# Patient Record
Sex: FEMALE | Race: WHITE | Employment: FULL TIME | URBAN - NONMETROPOLITAN AREA
[De-identification: names, ages, dates, MRNs, and addresses within clinical notes are randomized per-mention and may not be internally consistent; named-entity substitution may affect disease eponyms.]

---

## 2017-10-04 ENCOUNTER — HOSPITAL ENCOUNTER (EMERGENCY)
Age: 27
Discharge: HOME | End: 2017-10-04

## 2017-10-04 ENCOUNTER — APPOINTMENT (OUTPATIENT)
Dept: GENERAL RADIOLOGY | Age: 27
End: 2017-10-04

## 2017-10-04 VITALS
DIASTOLIC BLOOD PRESSURE: 65 MMHG | RESPIRATION RATE: 16 BRPM | BODY MASS INDEX: 27.09 KG/M2 | TEMPERATURE: 98.9 F | HEIGHT: 72 IN | OXYGEN SATURATION: 100 % | SYSTOLIC BLOOD PRESSURE: 131 MMHG | HEART RATE: 77 BPM | WEIGHT: 200 LBS

## 2017-10-04 LAB
GROUP A STREP CULTURE, REFLEX: NEGATIVE
REFLEX THROAT C + S: NORMAL

## 2017-10-04 PROCEDURE — 71020 XR CHEST STANDARD TWO VW: CPT

## 2017-10-04 PROCEDURE — 4500000002 HC ER NO CHARGE

## 2017-10-04 PROCEDURE — 87070 CULTURE OTHR SPECIMN AEROBIC: CPT

## 2017-10-04 PROCEDURE — 87880 STREP A ASSAY W/OPTIC: CPT

## 2017-10-04 ASSESSMENT — PAIN SCALES - GENERAL: PAINLEVEL_OUTOF10: 8

## 2017-10-04 NOTE — ED TRIAGE NOTES
Pt to er. Pt c/o sough and sore throat worse with coughing. States coughing up thick yellow stuff. No nfevers.

## 2017-10-06 LAB — THROAT/NOSE CULTURE: NORMAL

## 2019-01-10 ENCOUNTER — HOSPITAL ENCOUNTER (EMERGENCY)
Age: 29
Discharge: HOME | End: 2019-01-10
Attending: EMERGENCY MEDICINE

## 2019-01-10 VITALS
WEIGHT: 200 LBS | TEMPERATURE: 98.8 F | SYSTOLIC BLOOD PRESSURE: 105 MMHG | HEART RATE: 80 BPM | RESPIRATION RATE: 20 BRPM | OXYGEN SATURATION: 98 % | HEIGHT: 72 IN | BODY MASS INDEX: 27.09 KG/M2 | DIASTOLIC BLOOD PRESSURE: 68 MMHG

## 2019-01-10 DIAGNOSIS — Z33.2: Primary | ICD-10-CM

## 2019-01-10 PROCEDURE — 99282 EMERGENCY DEPT VISIT SF MDM: CPT

## 2019-01-10 ASSESSMENT — ENCOUNTER SYMPTOMS
RHINORRHEA: 0
DIARRHEA: 0
NAUSEA: 0
VOMITING: 0
ABDOMINAL PAIN: 0
BACK PAIN: 0
SHORTNESS OF BREATH: 0
SORE THROAT: 0

## 2020-09-11 ENCOUNTER — HOSPITAL ENCOUNTER (OUTPATIENT)
Age: 30
Setting detail: SPECIMEN
Discharge: HOME | End: 2020-09-11

## 2020-09-17 ENCOUNTER — HOSPITAL ENCOUNTER (EMERGENCY)
Age: 30
Discharge: HOME | End: 2020-09-17

## 2020-09-17 ENCOUNTER — APPOINTMENT (OUTPATIENT)
Dept: GENERAL RADIOLOGY | Age: 30
End: 2020-09-17

## 2020-09-17 VITALS
TEMPERATURE: 97.9 F | RESPIRATION RATE: 18 BRPM | SYSTOLIC BLOOD PRESSURE: 113 MMHG | DIASTOLIC BLOOD PRESSURE: 65 MMHG | OXYGEN SATURATION: 99 % | HEART RATE: 98 BPM

## 2020-09-17 PROCEDURE — 72100 X-RAY EXAM L-S SPINE 2/3 VWS: CPT

## 2020-09-17 PROCEDURE — 99281 EMR DPT VST MAYX REQ PHY/QHP: CPT

## 2020-09-17 PROCEDURE — 96372 THER/PROPH/DIAG INJ SC/IM: CPT

## 2020-09-17 PROCEDURE — 6360000002 HC RX W HCPCS: Performed by: PHYSICIAN ASSISTANT

## 2020-09-17 RX ORDER — KETOROLAC TROMETHAMINE 30 MG/ML
30 INJECTION, SOLUTION INTRAMUSCULAR; INTRAVENOUS ONCE
Status: COMPLETED | OUTPATIENT
Start: 2020-09-17 | End: 2020-09-17

## 2020-09-17 RX ORDER — CYCLOBENZAPRINE HCL 10 MG
10 TABLET ORAL 3 TIMES DAILY PRN
Qty: 21 TABLET | Refills: 0 | Status: SHIPPED | OUTPATIENT
Start: 2020-09-17 | End: 2020-09-27

## 2020-09-17 RX ORDER — NAPROXEN 500 MG/1
500 TABLET ORAL 2 TIMES DAILY
Qty: 60 TABLET | Refills: 0 | Status: SHIPPED | OUTPATIENT
Start: 2020-09-17

## 2020-09-17 RX ADMIN — KETOROLAC TROMETHAMINE 30 MG: 30 INJECTION, SOLUTION INTRAMUSCULAR at 18:31

## 2020-09-17 ASSESSMENT — PAIN SCALES - GENERAL: PAINLEVEL_OUTOF10: 10

## 2020-09-17 NOTE — ED PROVIDER NOTES
Reason for Visit: Back Pain      HISTORY OF PRESENT ILLNESS       HPI: This 26 y.o. femalepresents to the emergency department for low back pain ongoing for several hours. Patient states that she stood up and heard a pop in her low back. Aching in nature constant worse with certain motions movements touch notes makes better mild severity. No bowel or bladder dysfunction. No saddle paresthesias. Able to ambulate without difficulty. No chest pain shortness of breath fever chills nausea vomiting. Other complaints. No past medical history on file.     Past Surgical History:   Procedure Laterality Date    WISDOM TOOTH EXTRACTION         Patricia Terrazas   Social History     Socioeconomic History    Marital status: Single     Spouse name: Not on file    Number of children: Not on file    Years of education: Not on file    Highest education level: Not on file   Occupational History    Not on file   Social Needs    Financial resource strain: Not on file    Food insecurity     Worry: Not on file     Inability: Not on file    Transportation needs     Medical: Not on file     Non-medical: Not on file   Tobacco Use    Smoking status: Current Every Day Smoker     Packs/day: 0.50     Types: Cigarettes    Smokeless tobacco: Never Used   Substance and Sexual Activity    Alcohol use: No     Alcohol/week: 0.0 standard drinks    Drug use: Yes     Types: Marijuana    Sexual activity: Yes     Partners: Male   Lifestyle    Physical activity     Days per week: Not on file     Minutes per session: Not on file    Stress: Not on file   Relationships    Social connections     Talks on phone: Not on file     Gets together: Not on file     Attends [employer] service: Not on file     Active member of club or organization: Not on file     Attends meetings of clubs or organizations: Not on file     Relationship status: Not on file    Intimate partner violence     Fear of current or ex partner: Not on file     Emotionally abused: Not on file     Physically abused: Not on file     Forced sexual activity: Not on file   Other Topics Concern    Not on file   Social History Narrative    Not on file       Previous Medications    No medications on file       Allergies: Allergies as of 09/17/2020    (No Known Allergies)       Review of Systems       See HPI for further details. At least 10 systems reviewed and are otherwise negative unless noted in the history of present illness. Constitutional: Denies fever or chills   Eyes: Denies change in visual acuity   HENT: Denies nasal congestion or sore throat   Respiratory: Denies cough or shortness of breath   Cardiovascular: Denies chest pain or edema   GI: Denies abdominal pain, nausea, vomiting, bloody stools or diarrhea   : Denies dysuria   Musculoskeletal: Denies  joint pain admits back pain  Integument: Denies rash   Neurologic: Denies headache, focal weakness or sensory changes   Endocrine: Denies polyuria or polydipsia   Lymphatic: Denies swollen glands   Psychiatric: Denies depression or anxiety       Physical Exam       Vitals:    09/17/20 1749   BP: 113/65   Pulse: 98   Resp: 18   Temp: 97.9 °F (36.6 °C)   SpO2: 99%       Constitutional: No acute distress, Non-toxic appearance; well nourished    HENT: Normocephalic, Atraumatic, Bilateral external ears normal, Oropharynx moist, No oral exudates, Nose normal.    Eyes: PERRLA, EOMI, Conjunctiva normal, No discharge. Neck: Normal range of motion, No tenderness, Supple, No lymphadenopathy, No stridor. Cardiovascular: Normal heart rate, Normal rhythm, No murmurs, No rubs, No gallops. Pulmonary/Chest: Normal breath sounds, No respiratory distress, No wheezing, No  chest tenderness    Abdomen:  Bowel sounds normal, Soft, No tenderness, No masses, No pulsatile masses    Back: with paravertebral L4-L5 tenderness, No CVA tenderness    Extremities: Normal range of motion, Intact distal pulses, No edema, No tenderness    Neurologic: Alert & oriented x 3, Normal motor function, Normal sensory function, No focal defecits    Skin: Warm, Dry, No erythema, No rash    Psychiatric: Alert and oriented to person, place, and time. Patient maintains good eye contact. Mood and affect were normal. Concentration appeared normal      Diagnostic Studies       Please see electronic medical record for any tests performed in the ED. Labs:    Labs Reviewed - No data to display    Radiology:    XR LUMBAR SPINE (2-3 VIEWS)   Final Result    Mild degenerative changes of the lumbar spine. **This report has been created using voice recognition software. It may contain minor errors which are inherent in voice recognition technology. **      Final report electronically signed by Dr. Tanika Trivedi MD on 9/17/2020 6:24 PM            Emergency Department Procedures         ED Course and MDM       Vivian Carson is a 26 y.o. female who presented to the emergency department with a chief complaint of low back pain     Patient was seen, history and physical exam was performed. Patient remains stable here in the emergency department. Patient's  imaging studies and physical examination findings were reviewed and were reassuring. Labs Reviewed - No data to display  Medications   ketorolac (TORADOL) injection 30 mg (has no administration in time range)     New Prescriptions    CYCLOBENZAPRINE (FLEXERIL) 10 MG TABLET    Take 1 tablet by mouth 3 times daily as needed for Muscle spasms    NAPROXEN (NAPROSYN) 500 MG TABLET    Take 1 tablet by mouth 2 times daily         Counseling     The emergency provider has spoken with the patient and discussed today's findings, in addition to providing specific details for the plan of care. Questions are answered and there is agreement with the plan. Patient was given clear discharge and followup instructions including return to the ER immediately for worsening concerns.  Patient is advised to followup with primary care physician and/or referred physician in the next one to two days or sooner if worsening and to return to the ER immediately as above with any concerns. Usual and customary treatment and medication side effects and warning signs discussed. Patient was discharged from emergency department in good condition with all questions answered and patient has demonstrated capacity to understand these instructions and to follow up. Refer to the patient's discharge summary for more information on plan and follow-up. I have explained to the patient in appropriate terminology our work-up in the ED and their diagnosis. I have also given anticipatory guidance and expectant management of their condition as an outpatient as per my custom. They are instructed to return to the ED if their condition deteriorates in any way     This patient was seen under the direct supervision of the attending physician who was available for consultation. Differential Diagnosis    strain vs sciatica vs muscle spasm    Consults: None     DECISION to ADMIT / DISCHARGE:     6:30 PM    Clinical Impression       1.   1. Strain of lumbar region, initial encounter        Disposition       All results were shared with the patient, medical decision making was discussed and all questions were answered, and she agreed to assessment and plan. Patient was DISCHARGED from the hospital. Based on the reassuring ED workup and patient's stable vital signs, I feel the patient may be safely discharged home. At this point in time, I believe the patient has the mental capacity to make medical decisions.           Devora Forte  09/17/20 2008

## 2021-05-01 ENCOUNTER — HOSPITAL ENCOUNTER (EMERGENCY)
Age: 31
Discharge: HOME | End: 2021-05-01

## 2021-05-01 VITALS
DIASTOLIC BLOOD PRESSURE: 77 MMHG | WEIGHT: 200 LBS | OXYGEN SATURATION: 100 % | TEMPERATURE: 98.3 F | SYSTOLIC BLOOD PRESSURE: 102 MMHG | BODY MASS INDEX: 27.09 KG/M2 | HEART RATE: 102 BPM | HEIGHT: 72 IN | RESPIRATION RATE: 19 BRPM
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
## 2021-05-01 DIAGNOSIS — S09.90XA CLOSED HEAD INJURY, INITIAL ENCOUNTER: ICD-10-CM

## 2021-05-01 LAB
ANION GAP SERPL CALCULATED.3IONS-SCNC: 11 MEQ/L (ref 8–16)
BASOPHILS # BLD: 0.3 %
BASOPHILS ABSOLUTE: 0 THOU/MM3 (ref 0–0.1)
BUN BLDV-MCNC: 12 MG/DL (ref 7–22)
CALCIUM SERPL-MCNC: 9.4 MG/DL (ref 8.5–10.5)
CHLORIDE BLD-SCNC: 99 MEQ/L (ref 98–111)
CO2: 27 MEQ/L (ref 23–33)
CREAT SERPL-MCNC: 0.8 MG/DL (ref 0.4–1.2)
EKG ATRIAL RATE: 102 BPM
EKG P AXIS: 66 DEGREES
EKG P-R INTERVAL: 122 MS
EKG Q-T INTERVAL: 348 MS
EKG QRS DURATION: 80 MS
EKG QTC CALCULATION (BAZETT): 453 MS
EKG R AXIS: 66 DEGREES
EKG T AXIS: 56 DEGREES
EKG VENTRICULAR RATE: 102 BPM
EOSINOPHIL # BLD: 0.9 %
EOSINOPHILS ABSOLUTE: 0.1 THOU/MM3 (ref 0–0.4)
ERYTHROCYTE [DISTWIDTH] IN BLOOD BY AUTOMATED COUNT: 12 % (ref 11.5–14.5)
ERYTHROCYTE [DISTWIDTH] IN BLOOD BY AUTOMATED COUNT: 40.7 FL (ref 35–45)
ETHYL ALCOHOL, SERUM: < 0.01 %
GFR SERPL CREATININE-BSD FRML MDRD: 83 ML/MIN/1.73M2
GLUCOSE BLD-MCNC: 87 MG/DL (ref 70–108)
HCT VFR BLD CALC: 42 % (ref 37–47)
HEMOGLOBIN: 13.6 GM/DL (ref 12–16)
IMMATURE GRANS (ABS): 0.03 THOU/MM3 (ref 0–0.07)
IMMATURE GRANULOCYTES: 0.3 %
LYMPHOCYTES # BLD: 19.8 %
LYMPHOCYTES ABSOLUTE: 2 THOU/MM3 (ref 1–4.8)
MCH RBC QN AUTO: 29.9 PG (ref 26–33)
MCHC RBC AUTO-ENTMCNC: 32.4 GM/DL (ref 32.2–35.5)
MCV RBC AUTO: 92.3 FL (ref 81–99)
MONOCYTES # BLD: 7.2 %
MONOCYTES ABSOLUTE: 0.7 THOU/MM3 (ref 0.4–1.3)
NUCLEATED RED BLOOD CELLS: 0 /100 WBC
OSMOLALITY CALCULATION: 272.9 MOSMOL/KG (ref 275–300)
PLATELET # BLD: 382 THOU/MM3 (ref 130–400)
PMV BLD AUTO: 10.7 FL (ref 9.4–12.4)
POTASSIUM REFLEX MAGNESIUM: 3.9 MEQ/L (ref 3.5–5.2)
PREGNANCY, SERUM: NEGATIVE
RBC # BLD: 4.55 MILL/MM3 (ref 4.2–5.4)
SEG NEUTROPHILS: 71.5 %
SEGMENTED NEUTROPHILS ABSOLUTE COUNT: 7.1 THOU/MM3 (ref 1.8–7.7)
SODIUM BLD-SCNC: 137 MEQ/L (ref 135–145)
WBC # BLD: 9.9 THOU/MM3 (ref 4.8–10.8)

## 2021-05-01 PROCEDURE — 99285 EMERGENCY DEPT VISIT HI MDM: CPT

## 2021-05-01 PROCEDURE — 80048 BASIC METABOLIC PNL TOTAL CA: CPT

## 2021-05-01 PROCEDURE — 93005 ELECTROCARDIOGRAM TRACING: CPT | Performed by: NURSE PRACTITIONER

## 2021-05-01 PROCEDURE — 82077 ASSAY SPEC XCP UR&BREATH IA: CPT

## 2021-05-01 PROCEDURE — 85025 COMPLETE CBC W/AUTO DIFF WBC: CPT

## 2021-05-01 PROCEDURE — 84703 CHORIONIC GONADOTROPIN ASSAY: CPT

## 2021-05-01 PROCEDURE — 36415 COLL VENOUS BLD VENIPUNCTURE: CPT

## 2021-05-01 ASSESSMENT — ENCOUNTER SYMPTOMS
CHEST TIGHTNESS: 0
ABDOMINAL PAIN: 0
DIARRHEA: 0
SINUS PAIN: 0
PHOTOPHOBIA: 0
COUGH: 0
COLOR CHANGE: 0
WHEEZING: 0
SINUS PRESSURE: 0
BACK PAIN: 0
RHINORRHEA: 0
CONSTIPATION: 0
SORE THROAT: 0
VOMITING: 0
NAUSEA: 0
TROUBLE SWALLOWING: 0
SHORTNESS OF BREATH: 0

## 2021-05-01 ASSESSMENT — PAIN DESCRIPTION - PAIN TYPE: TYPE: ACUTE PAIN

## 2021-05-01 ASSESSMENT — PAIN SCALES - GENERAL: PAINLEVEL_OUTOF10: 10

## 2021-05-01 NOTE — ED TRIAGE NOTES
Pt comes in by EMS. She states she was walking down some stairs this morning and fell down about 10 steps. She is unsure if she lost consciousness but did hit her head. She now has a headache and left ear pain. She states her ear was hurting yesterday before she fell. She is alert and oriented in room.  GCS 15

## 2021-05-01 NOTE — ED PROVIDER NOTES
[facility] COMPLAINT       Chief Complaint   Patient presents with    Fall       Nurses Notes reviewed and I agree except as noted in the HPI. HISTORY OF PRESENT Katharine Brush is a 31 y.o. female who presents to the Emergency Department for the evaluation of fall at home. States that she missed a step going down steps and fell down approximately 10 steps, hit her head on the wall, believes she may have lost consciousness, complains of pain near her right temple. Denies gait problem, denies other injuries, denies abuse. The HPI was provided by the patient. REVIEW OF SYSTEMS     Review of Systems   Constitutional: Negative for chills, diaphoresis, fatigue and fever. HENT: Negative for congestion, ear pain, nosebleeds, rhinorrhea, sinus pressure, sinus pain, sore throat and trouble swallowing. Eyes: Negative for photophobia. Respiratory: Negative for cough, chest tightness, shortness of breath and wheezing. Cardiovascular: Negative for chest pain and palpitations. Gastrointestinal: Negative for abdominal pain, constipation, diarrhea, nausea and vomiting. Endocrine: Negative for cold intolerance and heat intolerance. Genitourinary: Negative for difficulty urinating, dysuria, flank pain, hematuria, pelvic pain, vaginal bleeding, vaginal discharge and vaginal pain. Musculoskeletal: Positive for myalgias. Negative for arthralgias, back pain, gait problem, joint swelling and neck stiffness. Skin: Negative for color change and wound. Neurological: Positive for headaches. Negative for dizziness, weakness, light-headedness and numbness. Psychiatric/Behavioral: Negative for agitation, behavioral problems, confusion, hallucinations, self-injury and suicidal ideas. The patient is not nervous/anxious. PAST MEDICAL HISTORY    has no past medical history on file.     SURGICAL HISTORY      has a past surgical history that includes [place] tooth extraction. CURRENT MEDICATIONS       Discharge Medication List as of 5/1/2021  3:08 AM      CONTINUE these medications which have NOT CHANGED    Details   naproxen (NAPROSYN) 500 MG tablet Take 1 tablet by mouth 2 times daily, Disp-60 tablet,R-0Print             ALLERGIES     has No Known Allergies. FAMILY HISTORY     She indicated that her mother is alive. She indicated that her father is alive. She indicated that her sister is alive. She indicated that her brother is alive. family history includes High Blood Pressure in her mother. SOCIAL HISTORY      reports that she has been smoking cigarettes. She has been smoking about 0.50 packs per day. She has never used smokeless tobacco. She reports previous drug use. Drug: Marijuana. She reports that she does not drink alcohol. PHYSICAL EXAM     INITIAL VITALS:  height is 6' (1.829 m) and weight is 200 lb (90.7 kg). Her oral temperature is 98.3 °F (36.8 °C). Her blood pressure is 102/77 and her pulse is 102. Her respiration is 19 and oxygen saturation is 100%. Physical Exam  Vitals signs and nursing note reviewed. Constitutional:       General: She is awake. She is not in acute distress. Appearance: Normal appearance. She is well-developed and normal weight. She is not ill-appearing, toxic-appearing or diaphoretic. HENT:      Head: Normocephalic and atraumatic. Right Ear: Tympanic membrane normal.      Left Ear: Tympanic membrane normal.      Nose: Nose normal.      Mouth/Throat:      Mouth: Mucous membranes are moist.      Pharynx: Oropharynx is clear. Eyes:      Extraocular Movements: Extraocular movements intact. Pupils: Pupils are equal, round, and reactive to light. Neck:      Musculoskeletal: Normal range of motion and neck supple. No neck rigidity or muscular tenderness. Vascular: No carotid bruit. Cardiovascular:      Rate and Rhythm: Normal rate and regular rhythm. Pulses: Normal pulses. Heart sounds: Normal heart sounds, S1 normal and S2 normal. Heart sounds not distant. No murmur. No friction rub. No gallop. Pulmonary:      Effort: Pulmonary effort is normal. No tachypnea, bradypnea, accessory muscle usage, prolonged expiration, respiratory distress or retractions. Breath sounds: Normal breath sounds. Abdominal:      General: Abdomen is flat. Bowel sounds are normal. There is no distension or abdominal bruit. There are no signs of injury. Palpations: Abdomen is soft. There is no shifting dullness, fluid wave, hepatomegaly, splenomegaly, mass or pulsatile mass. Tenderness: There is no abdominal tenderness. There is no guarding or rebound. Hernia: No hernia is present. Musculoskeletal: Normal range of motion. General: No swelling, tenderness, deformity or signs of injury. Right lower leg: No edema. Left lower leg: No edema. Lymphadenopathy:      Cervical: No cervical adenopathy. Skin:     General: Skin is warm and dry. Capillary Refill: Capillary refill takes less than 2 seconds. Neurological:      General: No focal deficit present. Mental Status: She is alert and oriented to person, place, and time. Mental status is at baseline. GCS: GCS eye subscore is 4. GCS verbal subscore is 5. GCS motor subscore is 6. Cranial Nerves: Cranial nerves are intact. Psychiatric:         Attention and Perception: Attention normal.         Mood and Affect: Mood normal.         Speech: Speech normal.         Behavior: Behavior normal. Behavior is cooperative. Thought Content:  Thought content normal.         Cognition and Memory: Cognition and memory normal.         Judgment: Judgment normal.          DIFFERENTIAL DIAGNOSIS:   Fall, closed head injury, contusion, hematoma    DIAGNOSTIC RESULTS     EKG: All EKG's are interpreted by the Emergency Department Physician who either signs or Co-signs this chart in the absence of a cardiologist.    None    RADIOLOGY: non-plainfilm images(s) such as CT, Ultrasound and MRI are read by the radiologist.    No orders to display       LABS:     Labs Reviewed   GLOMERULAR FILTRATION RATE, ESTIMATED - Abnormal; Notable for the following components:       Result Value    Est, Glom Filt Rate 83 (*)     All other components within normal limits   OSMOLALITY - Abnormal; Notable for the following components:    Osmolality Calc 272.9 (*)     All other components within normal limits   CBC WITH AUTO DIFFERENTIAL   BASIC METABOLIC PANEL W/ REFLEX TO MG FOR LOW K   HCG, SERUM, QUALITATIVE   ETHANOL   ANION GAP       EMERGENCY DEPARTMENT COURSE:   Vitals:    Vitals:    05/01/21 0153 05/01/21 0156 05/01/21 0250   BP: 123/77  102/77   Pulse: 107  102   Resp: 18  19   Temp: 98.3 °F (36.8 °C)     TempSrc: Oral     SpO2: 99%  100%   Weight:  200 lb (90.7 kg)    Height:  6' (1.829 m)        4:25 AM EDT: The patient was seen and evaluated. MDM:  Patient seen and evaluated for fall at home. States that she hit her head and believes she lost consciousness. No deformities or swelling noted on physical exam.  Basic labs were ordered, imaging was ordered, after approximately 1 hour patient states that she wishes to leave. She was ambulated with nursing staff with steady gait, believe that she is stable for discharge. Encourage patient to return if she develops more for severe symptoms. She was minimal to this plan, no other questions or concerns were voiced, patient was discharged in stable condition. CRITICAL CARE:   None    CONSULTS:  None    PROCEDURES:  None    FINAL IMPRESSION      1. Fall, initial encounter    2.  Closed head injury, initial encounter          DISPOSITION/PLAN   Discharge    PATIENT REFERRED TO:  [facility] [address] [address] EMERGENCY DEPT  [address]  Go to   If symptoms worsen      DISCHARGE MEDICATIONS:  Discharge Medication List as of 5/1/2021 3:08 AM          (Please note that portions of this note were completed with a voice recognition program.  Efforts were made to edit the dictations but occasionally words are mis-transcribed.)    The patient was given an opportunity to see the Emergency Attending. The patient voiced understanding that I was a Mid-LevelProvider and was in agreement with being seen independently by myself. Provider:  I personally performed the services described in the documentation, reviewed and edited the documentation which was dictated to the scribe in my presence, and it accurately records my words and actions.     MIKE Schulz CNP, 5/1/21, 6:22 AM       MIKE Schulz CNP  05/01/21 [address] [place] [address] APRN - CNP  05/05/21 9301

## 2021-05-01 NOTE — ED NOTES
Discharge instructions  discussed and explained with Pt. Pt. Verbalized understanding and has no further questions or needs at this time.         Jeyson Carter RN  05/01/21 0320       Jeyson Carter RN  05/01/21 0323

## 2021-05-01 NOTE — ED NOTES
Bed: 004A  Expected date:   Expected time:   Means of arrival: [facility]  Comments:     Roberto Greene RN  05/01/21 3978

## 2023-08-30 ENCOUNTER — HOSPITAL ENCOUNTER (EMERGENCY)
Age: 33
Discharge: HOME | End: 2023-08-30

## 2023-08-30 VITALS
DIASTOLIC BLOOD PRESSURE: 65 MMHG | RESPIRATION RATE: 16 BRPM | TEMPERATURE: 97.9 F | HEART RATE: 80 BPM | OXYGEN SATURATION: 98 % | SYSTOLIC BLOOD PRESSURE: 114 MMHG

## 2023-08-30 DIAGNOSIS — Z32.01 POSITIVE BLOOD PREGNANCY TEST: Primary | ICD-10-CM

## 2023-08-30 LAB — B-HCG SERPL QL: POSITIVE

## 2023-08-30 PROCEDURE — 84703 CHORIONIC GONADOTROPIN ASSAY: CPT

## 2023-08-30 PROCEDURE — 36415 COLL VENOUS BLD VENIPUNCTURE: CPT

## 2023-08-30 PROCEDURE — 99283 EMERGENCY DEPT VISIT LOW MDM: CPT

## 2023-08-30 NOTE — DISCHARGE INSTRUCTIONS
Rest, stay well-hydrated. Continue home medications as previously prescribed. Over-the-counter Tylenol as needed for fever, aches or pain. Follow-up with [facility], call for earliest available appointment. Prenatal has been sent to your preferred pharmacy. If any vaginal bleeding, abdominal pain, nausea, vomiting or any other concerns return to the ER immediately.

## 2023-08-30 NOTE — ED PROVIDER NOTES
PATIENT:  [facility]  [address] [facility] [address] [facility] [facility]  Schedule an appointment as soon as possible for a visit in 1 week  reevaluation      MIKE Andino CNP, APRN - CNP  08/30/23 2537

## 2023-08-30 NOTE — ED NOTES
Patient to ED wanting confirmation on a positive pregnancy test. LMP July 4th     Giovanna Hazel RN  08/30/23 5303

## 2023-09-13 ENCOUNTER — NURSE ONLY (OUTPATIENT)
Dept: LAB | Age: 33
End: 2023-09-13

## 2023-09-15 LAB
C. TRACHOMATIS DNA,THIN PREP: NEGATIVE
N. GONORRHOEAE DNA, THIN PREP: NEGATIVE
SOURCE: NORMAL

## 2023-09-16 LAB
SOURCE: NORMAL
TRICHOMONAS VAGINALI, MOLECULAR: NEGATIVE

## 2023-09-21 LAB — CYTOLOGY THIN PREP PAP: NORMAL

## 2024-03-12 ENCOUNTER — ANESTHESIA EVENT (OUTPATIENT)
Dept: LABOR AND DELIVERY | Age: 34
DRG: 560 | End: 2024-03-12
Payer: MEDICAID

## 2024-03-12 ENCOUNTER — HOSPITAL ENCOUNTER (INPATIENT)
Age: 34
LOS: 2 days | Discharge: HOME | DRG: 560 | End: 2024-03-14
Attending: OBSTETRICS & GYNECOLOGY | Admitting: OBSTETRICS & GYNECOLOGY
Payer: MEDICAID

## 2024-03-12 ENCOUNTER — ANESTHESIA (OUTPATIENT)
Dept: LABOR AND DELIVERY | Age: 34
DRG: 560 | End: 2024-03-12
Payer: MEDICAID

## 2024-03-12 PROBLEM — O42.90 LEAKAGE OF AMNIOTIC FLUID: Status: ACTIVE | Noted: 2024-03-12

## 2024-03-12 LAB
ABO: NORMAL
AMPHETAMINES UR QL SCN: NEGATIVE
ANTIBODY SCREEN: NORMAL
BARBITURATES UR QL SCN: NEGATIVE
BASOPHILS ABSOLUTE: 0 THOU/MM3 (ref 0–0.1)
BASOPHILS NFR BLD AUTO: 0.2 %
BENZODIAZ UR QL SCN: NEGATIVE
BZE UR QL SCN: NEGATIVE
CANNABINOIDS UR QL SCN: NEGATIVE
DEPRECATED RDW RBC AUTO: 46.5 FL (ref 35–45)
EOSINOPHIL NFR BLD AUTO: 1 %
EOSINOPHILS ABSOLUTE: 0.1 THOU/MM3 (ref 0–0.4)
ERYTHROCYTE [DISTWIDTH] IN BLOOD BY AUTOMATED COUNT: 13.4 % (ref 11.5–14.5)
FENTANYL: NEGATIVE
HCT VFR BLD AUTO: 40.9 % (ref 37–47)
HGB BLD-MCNC: 13.7 GM/DL (ref 12–16)
IMM GRANULOCYTES # BLD AUTO: 0.04 THOU/MM3 (ref 0–0.07)
IMM GRANULOCYTES NFR BLD AUTO: 0.4 %
LYMPHOCYTES ABSOLUTE: 2.2 THOU/MM3 (ref 1–4.8)
LYMPHOCYTES NFR BLD AUTO: 20.6 %
MCH RBC QN AUTO: 32 PG (ref 26–33)
MCHC RBC AUTO-ENTMCNC: 33.5 GM/DL (ref 32.2–35.5)
MCV RBC AUTO: 95.6 FL (ref 81–99)
MONOCYTES ABSOLUTE: 0.8 THOU/MM3 (ref 0.4–1.3)
MONOCYTES NFR BLD AUTO: 7.5 %
NEUTROPHILS NFR BLD AUTO: 70.3 %
NRBC BLD AUTO-RTO: 0 /100 WBC
OPIATES UR QL SCN: NEGATIVE
OXYCODONE: NEGATIVE
PCP UR QL SCN: NEGATIVE
PLATELET # BLD AUTO: 236 THOU/MM3 (ref 130–400)
PMV BLD AUTO: 12.7 FL (ref 9.4–12.4)
RBC # BLD AUTO: 4.28 MILL/MM3 (ref 4.2–5.4)
RH FACTOR: NORMAL
SEGMENTED NEUTROPHILS ABSOLUTE COUNT: 7.5 THOU/MM3 (ref 1.8–7.7)
WBC # BLD AUTO: 10.6 THOU/MM3 (ref 4.8–10.8)

## 2024-03-12 PROCEDURE — 86900 BLOOD TYPING SEROLOGIC ABO: CPT

## 2024-03-12 PROCEDURE — 6370000000 HC RX 637 (ALT 250 FOR IP): Performed by: OBSTETRICS & GYNECOLOGY

## 2024-03-12 PROCEDURE — 2500000003 HC RX 250 WO HCPCS: Performed by: ANESTHESIOLOGY

## 2024-03-12 PROCEDURE — 88307 TISSUE EXAM BY PATHOLOGIST: CPT

## 2024-03-12 PROCEDURE — 10907ZC DRAINAGE OF AMNIOTIC FLUID, THERAPEUTIC FROM PRODUCTS OF CONCEPTION, VIA NATURAL OR ARTIFICIAL OPENING: ICD-10-PCS | Performed by: OBSTETRICS & GYNECOLOGY

## 2024-03-12 PROCEDURE — 85025 COMPLETE CBC W/AUTO DIFF WBC: CPT

## 2024-03-12 PROCEDURE — 86901 BLOOD TYPING SEROLOGIC RH(D): CPT

## 2024-03-12 PROCEDURE — 3700000025 EPIDURAL BLOCK: Performed by: ANESTHESIOLOGY

## 2024-03-12 PROCEDURE — 1220000000 HC SEMI PRIVATE OB R&B

## 2024-03-12 PROCEDURE — 0HQ9XZZ REPAIR PERINEUM SKIN, EXTERNAL APPROACH: ICD-10-PCS | Performed by: OBSTETRICS & GYNECOLOGY

## 2024-03-12 PROCEDURE — 99465 NB RESUSCITATION: CPT

## 2024-03-12 PROCEDURE — 6360000002 HC RX W HCPCS

## 2024-03-12 PROCEDURE — 2580000003 HC RX 258: Performed by: OBSTETRICS & GYNECOLOGY

## 2024-03-12 PROCEDURE — 80307 DRUG TEST PRSMV CHEM ANLYZR: CPT

## 2024-03-12 PROCEDURE — 7200000001 HC VAGINAL DELIVERY

## 2024-03-12 PROCEDURE — 51701 INSERT BLADDER CATHETER: CPT

## 2024-03-12 PROCEDURE — 86850 RBC ANTIBODY SCREEN: CPT

## 2024-03-12 PROCEDURE — 86592 SYPHILIS TEST NON-TREP QUAL: CPT

## 2024-03-12 RX ORDER — ONDANSETRON 2 MG/ML
4 INJECTION INTRAMUSCULAR; INTRAVENOUS EVERY 6 HOURS PRN
Status: DISCONTINUED | OUTPATIENT
Start: 2024-03-12 | End: 2024-03-14 | Stop reason: HOSPADM

## 2024-03-12 RX ORDER — OXYCODONE HYDROCHLORIDE 5 MG/1
10 TABLET ORAL EVERY 4 HOURS PRN
Status: DISCONTINUED | OUTPATIENT
Start: 2024-03-12 | End: 2024-03-12 | Stop reason: HOSPADM

## 2024-03-12 RX ORDER — METHYLERGONOVINE MALEATE 0.2 MG/ML
200 INJECTION INTRAVENOUS PRN
Status: DISCONTINUED | OUTPATIENT
Start: 2024-03-12 | End: 2024-03-12 | Stop reason: HOSPADM

## 2024-03-12 RX ORDER — CARBOPROST TROMETHAMINE 250 UG/ML
250 INJECTION, SOLUTION INTRAMUSCULAR PRN
Status: DISCONTINUED | OUTPATIENT
Start: 2024-03-12 | End: 2024-03-14 | Stop reason: HOSPADM

## 2024-03-12 RX ORDER — ZOLPIDEM TARTRATE 10 MG/1
10 TABLET ORAL NIGHTLY PRN
Status: DISCONTINUED | OUTPATIENT
Start: 2024-03-12 | End: 2024-03-14 | Stop reason: HOSPADM

## 2024-03-12 RX ORDER — FAMOTIDINE 20 MG/1
20 TABLET, FILM COATED ORAL 2 TIMES DAILY PRN
Status: DISCONTINUED | OUTPATIENT
Start: 2024-03-12 | End: 2024-03-14 | Stop reason: HOSPADM

## 2024-03-12 RX ORDER — TERBUTALINE SULFATE 1 MG/ML
0.25 INJECTION, SOLUTION SUBCUTANEOUS
Status: DISCONTINUED | OUTPATIENT
Start: 2024-03-12 | End: 2024-03-12 | Stop reason: HOSPADM

## 2024-03-12 RX ORDER — OXYCODONE HYDROCHLORIDE 5 MG/1
5 TABLET ORAL EVERY 4 HOURS PRN
Status: DISCONTINUED | OUTPATIENT
Start: 2024-03-12 | End: 2024-03-14 | Stop reason: HOSPADM

## 2024-03-12 RX ORDER — SEVOFLURANE 250 ML/250ML
1 LIQUID RESPIRATORY (INHALATION) CONTINUOUS PRN
Status: DISCONTINUED | OUTPATIENT
Start: 2024-03-12 | End: 2024-03-12 | Stop reason: HOSPADM

## 2024-03-12 RX ORDER — SODIUM CHLORIDE, SODIUM LACTATE, POTASSIUM CHLORIDE, AND CALCIUM CHLORIDE .6; .31; .03; .02 G/100ML; G/100ML; G/100ML; G/100ML
500 INJECTION, SOLUTION INTRAVENOUS PRN
Status: DISCONTINUED | OUTPATIENT
Start: 2024-03-12 | End: 2024-03-12 | Stop reason: HOSPADM

## 2024-03-12 RX ORDER — CARBOPROST TROMETHAMINE 250 UG/ML
250 INJECTION, SOLUTION INTRAMUSCULAR PRN
Status: DISCONTINUED | OUTPATIENT
Start: 2024-03-12 | End: 2024-03-12 | Stop reason: HOSPADM

## 2024-03-12 RX ORDER — DIPHENHYDRAMINE HCL 25 MG
25 TABLET ORAL EVERY 6 HOURS PRN
Status: DISCONTINUED | OUTPATIENT
Start: 2024-03-12 | End: 2024-03-14 | Stop reason: HOSPADM

## 2024-03-12 RX ORDER — ONDANSETRON 2 MG/ML
4 INJECTION INTRAMUSCULAR; INTRAVENOUS EVERY 6 HOURS PRN
Status: DISCONTINUED | OUTPATIENT
Start: 2024-03-12 | End: 2024-03-12

## 2024-03-12 RX ORDER — MORPHINE SULFATE 2 MG/ML
2 INJECTION, SOLUTION INTRAMUSCULAR; INTRAVENOUS
Status: DISCONTINUED | OUTPATIENT
Start: 2024-03-12 | End: 2024-03-12 | Stop reason: HOSPADM

## 2024-03-12 RX ORDER — MORPHINE SULFATE 4 MG/ML
4 INJECTION, SOLUTION INTRAMUSCULAR; INTRAVENOUS
Status: DISCONTINUED | OUTPATIENT
Start: 2024-03-12 | End: 2024-03-14 | Stop reason: HOSPADM

## 2024-03-12 RX ORDER — DOCUSATE SODIUM 100 MG/1
100 CAPSULE, LIQUID FILLED ORAL 2 TIMES DAILY PRN
Status: DISCONTINUED | OUTPATIENT
Start: 2024-03-12 | End: 2024-03-14 | Stop reason: HOSPADM

## 2024-03-12 RX ORDER — OXYTOCIN/0.9 % SODIUM CHLORIDE 30/500 ML
1-20 PLASTIC BAG, INJECTION (ML) INTRAVENOUS CONTINUOUS
Status: DISCONTINUED | OUTPATIENT
Start: 2024-03-12 | End: 2024-03-12

## 2024-03-12 RX ORDER — SODIUM CHLORIDE 0.9 % (FLUSH) 0.9 %
5-40 SYRINGE (ML) INJECTION EVERY 12 HOURS SCHEDULED
Status: DISCONTINUED | OUTPATIENT
Start: 2024-03-12 | End: 2024-03-14 | Stop reason: HOSPADM

## 2024-03-12 RX ORDER — ONDANSETRON 4 MG/1
8 TABLET, ORALLY DISINTEGRATING ORAL EVERY 8 HOURS PRN
Status: DISCONTINUED | OUTPATIENT
Start: 2024-03-12 | End: 2024-03-14 | Stop reason: HOSPADM

## 2024-03-12 RX ORDER — SODIUM CHLORIDE, SODIUM LACTATE, POTASSIUM CHLORIDE, CALCIUM CHLORIDE 600; 310; 30; 20 MG/100ML; MG/100ML; MG/100ML; MG/100ML
INJECTION, SOLUTION INTRAVENOUS CONTINUOUS
Status: DISCONTINUED | OUTPATIENT
Start: 2024-03-12 | End: 2024-03-12

## 2024-03-12 RX ORDER — OXYCODONE HYDROCHLORIDE 5 MG/1
10 TABLET ORAL EVERY 4 HOURS PRN
Status: DISCONTINUED | OUTPATIENT
Start: 2024-03-12 | End: 2024-03-14 | Stop reason: HOSPADM

## 2024-03-12 RX ORDER — NALOXONE HYDROCHLORIDE 0.4 MG/ML
INJECTION, SOLUTION INTRAMUSCULAR; INTRAVENOUS; SUBCUTANEOUS PRN
Status: DISCONTINUED | OUTPATIENT
Start: 2024-03-12 | End: 2024-03-12

## 2024-03-12 RX ORDER — ONDANSETRON 4 MG/1
4 TABLET, ORALLY DISINTEGRATING ORAL EVERY 6 HOURS PRN
Status: DISCONTINUED | OUTPATIENT
Start: 2024-03-12 | End: 2024-03-12

## 2024-03-12 RX ORDER — OXYTOCIN/0.9 % SODIUM CHLORIDE 30/500 ML
PLASTIC BAG, INJECTION (ML) INTRAVENOUS
Status: COMPLETED
Start: 2024-03-12 | End: 2024-03-12

## 2024-03-12 RX ORDER — MORPHINE SULFATE 4 MG/ML
4 INJECTION, SOLUTION INTRAMUSCULAR; INTRAVENOUS
Status: DISCONTINUED | OUTPATIENT
Start: 2024-03-12 | End: 2024-03-12 | Stop reason: HOSPADM

## 2024-03-12 RX ORDER — SODIUM CHLORIDE 0.9 % (FLUSH) 0.9 %
5-40 SYRINGE (ML) INJECTION PRN
Status: DISCONTINUED | OUTPATIENT
Start: 2024-03-12 | End: 2024-03-14 | Stop reason: HOSPADM

## 2024-03-12 RX ORDER — MISOPROSTOL 200 UG/1
TABLET ORAL
Status: DISCONTINUED
Start: 2024-03-12 | End: 2024-03-12 | Stop reason: WASHOUT

## 2024-03-12 RX ORDER — MORPHINE SULFATE 4 MG/ML
2 INJECTION, SOLUTION INTRAMUSCULAR; INTRAVENOUS
Status: DISCONTINUED | OUTPATIENT
Start: 2024-03-12 | End: 2024-03-14 | Stop reason: HOSPADM

## 2024-03-12 RX ORDER — MODIFIED LANOLIN
OINTMENT (GRAM) TOPICAL PRN
Status: DISCONTINUED | OUTPATIENT
Start: 2024-03-12 | End: 2024-03-14 | Stop reason: HOSPADM

## 2024-03-12 RX ORDER — LIDOCAINE HYDROCHLORIDE 10 MG/ML
30 INJECTION, SOLUTION INFILTRATION; PERINEURAL PRN
Status: DISCONTINUED | OUTPATIENT
Start: 2024-03-12 | End: 2024-03-12 | Stop reason: HOSPADM

## 2024-03-12 RX ORDER — OXYCODONE HYDROCHLORIDE 5 MG/1
5 TABLET ORAL EVERY 4 HOURS PRN
Status: DISCONTINUED | OUTPATIENT
Start: 2024-03-12 | End: 2024-03-12 | Stop reason: HOSPADM

## 2024-03-12 RX ORDER — FENTANYL CITRATE 50 UG/ML
50 INJECTION, SOLUTION INTRAMUSCULAR; INTRAVENOUS
Status: DISCONTINUED | OUTPATIENT
Start: 2024-03-12 | End: 2024-03-12 | Stop reason: HOSPADM

## 2024-03-12 RX ORDER — ACETAMINOPHEN 500 MG
1000 TABLET ORAL EVERY 8 HOURS
Status: DISCONTINUED | OUTPATIENT
Start: 2024-03-13 | End: 2024-03-14 | Stop reason: HOSPADM

## 2024-03-12 RX ORDER — DIPHENHYDRAMINE HYDROCHLORIDE 50 MG/ML
25 INJECTION INTRAMUSCULAR; INTRAVENOUS EVERY 4 HOURS PRN
Status: DISCONTINUED | OUTPATIENT
Start: 2024-03-12 | End: 2024-03-12 | Stop reason: HOSPADM

## 2024-03-12 RX ORDER — SODIUM CHLORIDE, SODIUM LACTATE, POTASSIUM CHLORIDE, CALCIUM CHLORIDE 600; 310; 30; 20 MG/100ML; MG/100ML; MG/100ML; MG/100ML
INJECTION, SOLUTION INTRAVENOUS CONTINUOUS
Status: DISCONTINUED | OUTPATIENT
Start: 2024-03-12 | End: 2024-03-14 | Stop reason: HOSPADM

## 2024-03-12 RX ORDER — SODIUM CHLORIDE, SODIUM LACTATE, POTASSIUM CHLORIDE, AND CALCIUM CHLORIDE .6; .31; .03; .02 G/100ML; G/100ML; G/100ML; G/100ML
1000 INJECTION, SOLUTION INTRAVENOUS PRN
Status: DISCONTINUED | OUTPATIENT
Start: 2024-03-12 | End: 2024-03-12 | Stop reason: HOSPADM

## 2024-03-12 RX ORDER — ACETAMINOPHEN 325 MG/1
650 TABLET ORAL EVERY 4 HOURS PRN
Status: DISCONTINUED | OUTPATIENT
Start: 2024-03-12 | End: 2024-03-12 | Stop reason: HOSPADM

## 2024-03-12 RX ORDER — METHYLERGONOVINE MALEATE 0.2 MG/ML
200 INJECTION INTRAVENOUS PRN
Status: DISCONTINUED | OUTPATIENT
Start: 2024-03-12 | End: 2024-03-14 | Stop reason: HOSPADM

## 2024-03-12 RX ORDER — OXYTOCIN/0.9 % SODIUM CHLORIDE 30/500 ML
87.3 PLASTIC BAG, INJECTION (ML) INTRAVENOUS PRN
Status: DISCONTINUED | OUTPATIENT
Start: 2024-03-12 | End: 2024-03-12

## 2024-03-12 RX ORDER — MISOPROSTOL 200 UG/1
400 TABLET ORAL PRN
Status: DISCONTINUED | OUTPATIENT
Start: 2024-03-12 | End: 2024-03-12

## 2024-03-12 RX ORDER — IBUPROFEN 800 MG/1
800 TABLET ORAL EVERY 8 HOURS
Status: DISCONTINUED | OUTPATIENT
Start: 2024-03-13 | End: 2024-03-14 | Stop reason: HOSPADM

## 2024-03-12 RX ORDER — SODIUM CHLORIDE 9 MG/ML
INJECTION, SOLUTION INTRAVENOUS PRN
Status: DISCONTINUED | OUTPATIENT
Start: 2024-03-12 | End: 2024-03-14 | Stop reason: HOSPADM

## 2024-03-12 RX ORDER — DIPHENHYDRAMINE HCL 25 MG
25 TABLET ORAL EVERY 4 HOURS PRN
Status: DISCONTINUED | OUTPATIENT
Start: 2024-03-12 | End: 2024-03-12 | Stop reason: HOSPADM

## 2024-03-12 RX ORDER — FERROUS SULFATE 325(65) MG
325 TABLET ORAL
Status: DISCONTINUED | OUTPATIENT
Start: 2024-03-13 | End: 2024-03-14 | Stop reason: HOSPADM

## 2024-03-12 RX ORDER — MISOPROSTOL 200 UG/1
1000 TABLET ORAL PRN
Status: DISCONTINUED | OUTPATIENT
Start: 2024-03-12 | End: 2024-03-14 | Stop reason: HOSPADM

## 2024-03-12 RX ORDER — TRANEXAMIC ACID 10 MG/ML
1000 INJECTION, SOLUTION INTRAVENOUS
Status: DISCONTINUED | OUTPATIENT
Start: 2024-03-12 | End: 2024-03-12 | Stop reason: HOSPADM

## 2024-03-12 RX ADMIN — SODIUM CHLORIDE, POTASSIUM CHLORIDE, SODIUM LACTATE AND CALCIUM CHLORIDE: 600; 310; 30; 20 INJECTION, SOLUTION INTRAVENOUS at 17:49

## 2024-03-12 RX ADMIN — Medication: at 19:47

## 2024-03-12 RX ADMIN — Medication 16 ML/HR: at 18:15

## 2024-03-12 RX ADMIN — Medication 166.7 ML: at 19:28

## 2024-03-12 RX ADMIN — Medication 1 MILLI-UNITS/MIN: at 18:42

## 2024-03-12 RX ADMIN — ACETAMINOPHEN 650 MG: 325 TABLET ORAL at 20:43

## 2024-03-12 RX ADMIN — SODIUM CHLORIDE, POTASSIUM CHLORIDE, SODIUM LACTATE AND CALCIUM CHLORIDE: 600; 310; 30; 20 INJECTION, SOLUTION INTRAVENOUS at 16:53

## 2024-03-12 ASSESSMENT — PAIN DESCRIPTION - DESCRIPTORS: DESCRIPTORS: CRAMPING

## 2024-03-12 ASSESSMENT — PAIN SCALES - GENERAL: PAINLEVEL_OUTOF10: 8

## 2024-03-12 ASSESSMENT — PAIN DESCRIPTION - LOCATION: LOCATION: ABDOMEN

## 2024-03-12 NOTE — FLOWSHEET NOTE
Pt has 20g INT in LAC that was started per the squad. New IV restarted and INT from squad removed,tip intact.

## 2024-03-12 NOTE — FLOWSHEET NOTE
Surgery notified of di-di twins here in labor and SROM, clear fluid. Will likely need them to be on standby for delivery.

## 2024-03-12 NOTE — FLOWSHEET NOTE
EFM applied to soft and non-tender abdomen. Contractions palpated mod.   Baby A Left lower quadrant. FHT's 130's  Baby B Right mid quadrant- FHT's 135.

## 2024-03-12 NOTE — H&P
[facility]  History and Physical Update    Pt Name: Patricia Terrazas  MRN: [id number]  YOB: 1990  Date of evaluation: 3/12/2024    [] I have examined the patient and reviewed the H&P/Consult and there are no changes to the patient or plans.    [x] I have examined the patient and reviewed the H&P/Consult and have noted the following changes:     Pt is a 33  at 36w0d here for SROM of di-di twins boys that happened at about 1605 today. Pt states that the fluids have been draining clear fluids since. Pt was seen in Dr. Pinon's office earlier today and was dilated at 4cm. U/S in office showed vertex of both fetuses. She admits to having regular contractions ~3minutes. Last cervical check was about 1 hr ago and was /-1. Baby A and baby B baseline FHR ~120s. Will continue current plan for vaginal delivery.     Discussion with the patient and/ or family for proposed care, treatment, services; benefits, risks, side effects; likelihood of achieving goals and potential problems that may occur during recuperation was had and all questions were answered.  Discussion with the patient and/ or family of reasonable alternatives to the proposed care, treatment, services and the discussion of the risks, benefits, side effects related to the alternatives and the risk related to not receiving the proposed care treatment services was also had and all questions were answered.    If this is for an elective surgical procedure then The patient was counseled at length about the risks of mao Covid-19 during their perioperative period and any recovery window from their procedure.  The patient was made aware that mao Covid-19  may worsen their prognosis for recovering from their procedure  and lend to a higher morbidity and/or mortality risk.  All material risks, benefits, and reasonable alternatives including postponing the procedure were discussed. The patient  does wish to proceed with

## 2024-03-12 NOTE — FLOWSHEET NOTE
Dr ALFREDO Lu at bedside doing US to confirm presentation of both twins, vertex presentation of both fetuses noted.

## 2024-03-12 NOTE — ANESTHESIA PROCEDURE NOTES
Epidural Block    Patient location during procedure: OB  Start time: 3/12/2024 8:07 AM  End time: 3/12/2024 8:15 AM  Reason for block: labor epidural  Staffing  Performed: anesthesiologist   Anesthesiologist: Baljeet England MD  Performed by: Baljeet England MD  Authorized by: Baljeet England MD    Epidural  Patient position: sitting  Prep: ChloraPrep  Patient monitoring: continuous pulse ox and frequent blood pressure checks  Approach: midline  Location: L4-5  Injection technique: LIO saline  Provider prep: mask and sterile gloves  Needle  Needle type: Tuohy   Needle gauge: 18 G  Needle length: 3.5 in  Needle insertion depth: 8 cm  Catheter type: side hole  Catheter size: 20 G  Catheter at skin depth: 12 cm  Test dose: negativeCatheter Secured: tegaderm and tape  Assessment  Hemodynamics: stable  Attempts: 1  Outcomes: uncomplicated and patient tolerated procedure well  Preanesthetic Checklist  Completed: patient identified, IV checked, site marked, risks and benefits discussed, surgical/procedural consents, equipment checked, pre-op evaluation, timeout performed, anesthesia consent given, oxygen available, monitors applied/VS acknowledged, fire risk safety assessment completed and verbalized and blood product R/B/A discussed and consented

## 2024-03-12 NOTE — ANESTHESIA PRE PROCEDURE
Department of Anesthesiology  Preprocedure Note       Name:  Patricia Terrazas   Age:  33 y.o.  :  1990                                          MRN:  [id number]         Date:  3/12/2024      Surgeon: * No surgeons listed *    Procedure: * No procedures listed *    Medications prior to admission:   Prior to Admission medications    Medication Sig Start Date End Date Taking? Authorizing Provider   Prenatal MV-Min-Fe Fum-FA-DHA (PRENATAL 1 PO) Take 1 tablet by mouth daily   Yes Provider, MD Darvin       Current medications:    Current Facility-Administered Medications   Medication Dose Route Frequency Provider Last Rate Last Admin    oxytocin (PITOCIN) 30 units in 500 mL infusion Override Pull             oxytocin (PITOCIN) 30 units in 500 mL infusion  1-20 curt-units/min IntraVENous Continuous Susan Pinon MD        terbutaline (BRETHINE) injection 0.25 mg  0.25 mg SubCUTAneous Once PRN Susan Pinon MD        lactated ringers IV soln infusion   IntraVENous Continuous Susan Pinon  mL/hr at 24 1749 New Bag at 24 1749    lactated ringers bolus 500 mL  500 mL IntraVENous PRN Susan Pinon MD        Or    lactated ringers bolus 1,000 mL  1,000 mL IntraVENous PRN Susan Pinon MD        lidocaine 1 % injection 30 mL  30 mL Other PRN Susan Pinon MD        fentaNYL (SUBLIMAZE) injection 50 mcg  50 mcg IntraVENous Q2H PRN Susan Pinon MD        nitrous oxide 50% inhalation 1 each  1 each Inhalation Continuous PRSusan Richter MD        ondansetron (ZOFRAN) injection 4 mg  4 mg IntraVENous Q6H PRN Susan Pinon MD        Or    ondansetron (ZOFRAN-ODT) disintegrating tablet 4 mg  4 mg Oral Q6H PRN Susan Pinon MD        diphenhydrAMINE (BENADRYL) tablet 25 mg  25 mg Oral Q4H PRSusan Richter MD        Or    diphenhydrAMINE (BENADRYL) injection 25 mg  25 mg IntraVENous Q4H PRSusan Richter MD        oxytocin (PITOCIN) 30 units in 500 mL

## 2024-03-12 NOTE — FLOWSHEET NOTE
here at 36 0/7wks preg with di-di twins coming by squad from [facility]. Escorted by 2 police officers. Pt states her water broke around 1605, contractions are about every 2-3 mins, states she was 4cms in the office today. Dr. Pinon completed an US today, both are now vertex. Pt states since they are both head down, she plans a vaginal delivery. [place] drug screen discussed, pt voiced understanding and plans to give a urine sample.

## 2024-03-12 NOTE — FLOWSHEET NOTE
Dr. Pinon text messaged to call about new pt. 1646- Dr. iPnon returns call, updated on pt having her 4th and 5th babies, twins at 36 0/7wks with SROM at 1605, clear fluid.Cervix 5-6/90/0. Both babies on monitor, mod variability, both reassuring. It appears that she is mao every 3-4 mins. No steroids. Pt was seen by  today, was 4cm and babies were both vertex. Orders to initiate labor room orders. MD will call when she is signing out to Dr. [place] Lu. -GBS

## 2024-03-13 LAB — RPR SER QL: NONREACTIVE

## 2024-03-13 PROCEDURE — 1220000000 HC SEMI PRIVATE OB R&B

## 2024-03-13 PROCEDURE — 6370000000 HC RX 637 (ALT 250 FOR IP): Performed by: OBSTETRICS & GYNECOLOGY

## 2024-03-13 RX ADMIN — IBUPROFEN 800 MG: 800 TABLET, FILM COATED ORAL at 22:00

## 2024-03-13 RX ADMIN — IBUPROFEN 800 MG: 800 TABLET, FILM COATED ORAL at 13:25

## 2024-03-13 RX ADMIN — DOCUSATE SODIUM 100 MG: 100 CAPSULE, LIQUID FILLED ORAL at 08:09

## 2024-03-13 RX ADMIN — IBUPROFEN 800 MG: 800 TABLET, FILM COATED ORAL at 02:51

## 2024-03-13 RX ADMIN — ACETAMINOPHEN 1000 MG: 500 TABLET ORAL at 08:09

## 2024-03-13 RX ADMIN — DOCUSATE SODIUM 100 MG: 100 CAPSULE, LIQUID FILLED ORAL at 22:00

## 2024-03-13 ASSESSMENT — PAIN SCALES - GENERAL
PAINLEVEL_OUTOF10: 7
PAINLEVEL_OUTOF10: 7
PAINLEVEL_OUTOF10: 8

## 2024-03-13 ASSESSMENT — PAIN DESCRIPTION - DESCRIPTORS
DESCRIPTORS: DISCOMFORT
DESCRIPTORS: CRAMPING;SORE
DESCRIPTORS: DISCOMFORT

## 2024-03-13 ASSESSMENT — PAIN DESCRIPTION - ORIENTATION
ORIENTATION: LOWER

## 2024-03-13 ASSESSMENT — PAIN - FUNCTIONAL ASSESSMENT
PAIN_FUNCTIONAL_ASSESSMENT: ACTIVITIES ARE NOT PREVENTED

## 2024-03-13 ASSESSMENT — PAIN DESCRIPTION - LOCATION
LOCATION: PERINEUM
LOCATION: ABDOMEN;PERINEUM
LOCATION: ABDOMEN

## 2024-03-13 NOTE — L&D DELIVERY NOTE
Department of Obstetrics and Gynecology  Spontaneous Vaginal Delivery Note      Pt Name: Patricia Terrazas  MRN: [id number] Acct #: [id number]  YOB: 1990  Procedure Performed By: Ele Lu MD, MD      Pre-operative Diagnosis:   pregnancy <37 weeks and Amaya twins SROM  Post-operative Diagnosis: Same, delivered.  Procedure:  Spontaneous vaginal delivery x 2 and Repair first degree periurethral lac and perineal lacspontaneous laceration  Surgeon:  Ele Lu    Information for the patient's :  Andrew Terrazas [[id number]]      Information for the patient's :  Andrew Terrazas [[id number]]        Anesthesia:  epidural anesthesia  Estimated blood loss:  450ml  Specimen:  Placenta sent to pathology   Complications:  pt with precipatous delivery of 1st baby  Condition:  infants stable to special care nursery and mother stable    Details of Procedure:   The patient is a 33 y.o. female at 36w0d   OB History          5    Para   4    Term   3       1    AB   1    Living   5         SAB   1    IAB   0    Ectopic   0    Molar        Multiple   1    Live Births   5             who was admitted for SROM and labor. She received the following interventions: none.  The patient progressed well,did receive an epidural, became complete and started to push.  She delivered the baby a unexpectedly with the first \"practice\" push.  I was called and arrived approximately 5 minutes after that.  The ultrasound was used to confirm vertex of baby B the head was not ballotable and artificial rupture membranes took place she then pushed with 2 more contractions and delivered baby B with a cord around the neck x 1 baby B did not so the cord was clamped and cut quickly and the baby was handed off to the waiting nurse.  Cord blood from baby A had already been obtained cord blood from baby B was obtained as well as a cord segment from baby B.  The placenta was then

## 2024-03-13 NOTE — FLOWSHEET NOTE
Report received  from [place].  Mom wheeled in wheelchair over to [facility] to see twin boys.

## 2024-03-13 NOTE — FLOWSHEET NOTE
Meg RN at bedside, report given. Patient transported to [facility] to see  twins via wheelchair in stable condition.

## 2024-03-13 NOTE — L&D DELIVERY NOTE
Andrew Terrazas [[id number]]      Labor Events     Labor: Yes   Steroids: None  Cervical Ripening Date/Time:      Antibiotics Received during Labor: No  Rupture Date/Time:  3/12/24 16:04:00   Rupture Type: SROM  Fluid Color: Clear  Fluid Odor: None  Fluid Volume: Moderate  Augmentation: Oxytocin  Labor Complications: None              Anesthesia    Method: Epidural       Labor Event Times      Labor onset date/time:  3/12/24 16:00:00     Dilation complete date/time:  3/12/24 19:13:00 EDT     Start pushing date/time:  3/12/2024 19:13:00   Decision date/time (emergent ):            Labor Length    1st stage: 3h 13m  2nd stage: 0h 00m  3rd stage: 0h 15m       Delivery Details      Delivery Date: 3/12/24 Delivery Time: 19:13:00   Delivery Type: Vaginal, Spontaneous               Presentation    Presentation: Vertex  _: Occiput  _: Anterior       Shoulder Dystocia    Shoulder Dystocia Present?: No       Assisted Delivery Details    Forceps Attempted?: No  Vacuum Extractor Attempted?: No                           Cord    Vessels: 3 Vessels  Delayed Cord Clamping?: Yes  Cord Clamped Date/Time: 3/12/2024 19:15:00  Cord Blood Disposition: Lab  Gases Sent?: No              Placenta    Date/Time: 3/12/2024 19:28:00  Removal: Spontaneous  Appearance: Intact  Disposition: Pathology, Lab       Lacerations    Episiotomy: None  Perineal Lacerations: 1st  Other Lacerations: labial laceration, periurethral laceration  Periurethral Laceration: Right Repaired?: Yes     Labial Laceration: right Repaired?: Yes   Number of Repair Packets: 1       Vaginal Counts    Initial Count Personnel: INSTRUMENTS-16,LAPS-5,VACUUM-1,NEEDLE-1  Initial Count Verified By: SAE LIU RN  Intial Sponge Count: Correct Intial Needles Count: Correct Intial Instruments Count: Correct   Final Sponges Count: Correct Final Needles  Count: Correct Final Instruments Count: Correct   Final Count Personnel: 5 SPONGES,1+1=2 NEEDLES,16

## 2024-03-13 NOTE — PROGRESS NOTES
Rt attended delivery, per NRP guidelines Baby A did not need respiratory intervention at this time.

## 2024-03-13 NOTE — FLOWSHEET NOTE
MOM ADMITTED TO MOM BABY UNIT WITH  IN ROOM.  ORIENTED TO MOM BABY, SAFETY OF GETTING UP WITH NURSE FIRST 2 TIMES TO VOID.  IV INTACT IN LEFT WRIST.   INFANTS IN SCN.  CPS TO SEE PATIENT IN AM.

## 2024-03-13 NOTE — FLOWSHEET NOTE
UP TO BATHROOM, VOIDED #2 TIME, PERICARE PERFORMED, RETURNED TO BED,  IV DISCONTINUED, CATHETER INTACT, DRESSING APPLIED.

## 2024-03-13 NOTE — CARE COORDINATION
3/13/24, 1:11 PM EDT    DISCHARGE PLANNING EVALUATION     Referral made to [facility], cord blood results pending. Discharge plan for twins not determined at this time. MOB will return to [facility] halfway when she is released from hospital.

## 2024-03-13 NOTE — PROGRESS NOTES
Department of Obstetrics and Gynecology  Labor and Delivery  Attending Post Partum Progress Note    PPD #1    SUBJECTIVE: Feeling well. No complaints.    OBJECTIVE:     Vitals:  /68   Pulse 83   Temp 97.9 °F (36.6 °C) (Oral)   Resp 18   Ht 1.829 m (6')   Wt 119.7 kg (264 lb)   SpO2 100%   Breastfeeding Unknown   BMI 35.80 kg/m²     Uterus:  normal size, well involuted, firm, non-tender    DATA:    Hemoglobin:   Lab Results   Component Value Date/Time    HGB 13.7 03/12/2024 04:53 PM       ASSESSMENT & PLAN:  Doing well. Anticipate home on post partum day #2.  Social service consult    Susan Pinon MD 3/13/2024

## 2024-03-13 NOTE — PROGRESS NOTES
Rt attended delivery, per NRP guidelines Baby B needed respiratory interventions, Dr. Alicea in room throughout delivery, see SCN Rn note for further detail.

## 2024-03-13 NOTE — FLOWSHEET NOTE
Denies urge or desire to void, conchita care done for patient in bed. Pads and gown changed, dermoplast applied. Patient tolerated well.

## 2024-03-14 VITALS
HEART RATE: 68 BPM | RESPIRATION RATE: 16 BRPM | DIASTOLIC BLOOD PRESSURE: 71 MMHG | WEIGHT: 264 LBS | SYSTOLIC BLOOD PRESSURE: 111 MMHG | HEIGHT: 72 IN | BODY MASS INDEX: 35.76 KG/M2 | OXYGEN SATURATION: 100 % | TEMPERATURE: 97.7 F

## 2024-03-14 NOTE — PROGRESS NOTES
Discharged to halfway via [facility] Department.  Discharge teaching and instructions for diagnosis/procedure of  completed with patient using teachback method. AVS reviewed.  Patient voiced understanding regarding follow up appointments, and care of self.

## 2024-03-14 NOTE — DISCHARGE INSTRUCTIONS
housing.  Phone Number: 823.893.5404  Website: [email or web address]://www.[email or web address]      [facility]:  What they offer: Shelter for homeless and/or abused women with or without children. on-site services, along with several area social service agencies, assist everyone with housing, along with personal, educational and vocational goals, meals, showers and laundry.  Phone Number: 550.226.1364  Website: http://[facility].[employer]      [facility]:  What they offer:  Provide supportive housing for adult males and Females.  Phone Number: 116.982.6226    [facility] [employer] Families:  What they offer: Rapid-rehousing, homeless prevention, supportive services, shallow subsidy   Phone Number’s: 197.899.5762 or 794-476-2499  Website: http://[email or web address]

## 2024-03-14 NOTE — PROGRESS NOTES
Spoke with campus police that patient will be leaving at 1530 to go back to [facility].  They stated they will get a hold of them to pick patient up at that time.

## 2024-03-14 NOTE — ANESTHESIA POSTPROCEDURE EVALUATION
Department of Anesthesiology  Postprocedure Note    Patient: Patricia Terrazas  MRN: [id number]  YOB: 1990  Date of evaluation: 3/14/2024    Procedure Summary       Date: 03/12/24 Room / Location:     Anesthesia Start: 1807 Anesthesia Stop: 1922    Procedure: Labor Analgesia Diagnosis:     Scheduled Providers:  Responsible Provider: Baljeet England MD    Anesthesia Type: epidural ASA Status: 2            Anesthesia Type: No value filed.    Simeon Phase I: Simeon Score: 10    Simeon Phase II: Simeon Score: 10    Anesthesia Post Evaluation    Patient location during evaluation: floor  Patient participation: complete - patient participated  Level of consciousness: awake  Airway patency: patent  Nausea & Vomiting: no vomiting and no nausea  Cardiovascular status: hemodynamically stable  Respiratory status: acceptable  Hydration status: stable  Pain management: adequate        No notable events documented.

## 2024-03-14 NOTE — DISCHARGE SUMMARY
Vaginal Delivery Discharge Summary    Gestational Age:36w0d    Antepartum complications: mayela twins    Admission date: 3/12/2024  4:36 PM      Type of Delivery:      [place] [facility]  Information for the patient's :  Andrew Terrazas [[id number]]   male   Birth Weight: 2.75 kg (6 lb 1 oz)   Information for the patient's :  Andrew Terrazas [[id number]]   male   Birth Weight: 2.53 kg (5 lb 9.2 oz)     Labs: CBC   Lab Results   Component Value Date    HGB 13.7 2024    HCT 40.9 2024        Intrapartum complications: None    Postpartum complications: none    The patient is ambulating well. The patient is tolerating a normal diet.      Patient Instructions:   Activity: activity as tolerated and no sex for 6 weeks  Diet: regular  Wound Care: as directed    Discharge Information  Current Discharge Medication List        CONTINUE these medications which have NOT CHANGED    Details   Prenatal MV-Min-Fe Fum-FA-DHA (PRENATAL 1 PO) Take 1 tablet by mouth daily             No discharge procedures on file.    Condition: Good    Plan:   Follow up in 5 week(s)    Electronically signed by Susan Pinon MD on 3/14/2024 at 10:38 AM

## 2024-03-14 NOTE — PLAN OF CARE
Problem: Pain  Goal: Verbalizes/displays adequate comfort level or baseline comfort level  Outcome: Progressing  Flowsheets (Taken 3/12/2024 1734)  Verbalizes/displays adequate comfort level or baseline comfort level:   Encourage patient to monitor pain and request assistance   Consider cultural and social influences on pain and pain management     Problem: Vaginal Birth or  Section  Goal: Fetal and maternal status remain reassuring during the birth process  Description:  Birth OB-Pregnancy care plan goal which identifies if the fetal and maternal status remain reassuring during the birth process  Outcome: Progressing  Flowsheets (Taken 3/12/2024 1734)  Fetal and Maternal Status Remain Reassuring During the Birth Process:   Monitor vital signs   Monitor uterine activity   Monitor fetal heart rate   Monitor labor progression (Vaginal delivery)     Problem: Infection - Adult  Goal: Absence of infection during hospitalization  Outcome: Progressing  Flowsheets (Taken 3/12/2024 1734)  Absence of infection during hospitalization:   Assess and monitor for signs and symptoms of infection   Instruct and encourage patient and family to use good hand hygiene technique     Problem: Safety - Adult  Goal: Free from fall injury  Outcome: Progressing  Flowsheets (Taken 3/12/2024 1734)  Free From Fall Injury:   Instruct family/caregiver on patient safety   Based on caregiver fall risk screen, instruct family/caregiver to ask for assistance with transferring infant if caregiver noted to have fall risk factors     Problem: Discharge Planning  Goal: Discharge to home or other facility with appropriate resources  Outcome: Progressing  Flowsheets (Taken 3/12/2024 1734)  Discharge to home or other facility with appropriate resources: Refer to discharge planning if patient needs post-hospital services based on physician order or complex needs related to functional status, cognitive ability or social support system   Care plan 
  Problem: Risk for Elopement  Goal: Patient will not exit the unit/facility without proper excort  3/12/2024 2345 by Nicci Mike, RN  Outcome: Progressing  Flowsheets (Taken 3/12/2024 2345)  Nursing Interventions for Elopement Risk:   Assist with personal care needs such as toileting, eating, dressing, as needed to reduce the risk of wandering   Communicate/escalate to nursing supervisor the risk of elopement  3/12/2024 2343 by Nicci Mike RN  Outcome: Progressing  Flowsheets (Taken 3/12/2024 2245)  Nursing Interventions for Elopement Risk:   Assist with personal care needs such as toileting, eating, dressing, as needed to reduce the risk of wandering   Communicate/escalate to charge nurse the risk of elopement   Communicate/escalate to /other team member the risk of elopement   Reduce environmental triggers     Problem: Postpartum  Goal: Experiences normal postpartum course  Description:  Postpartum OB-Pregnancy care plan goal which identifies if the mother is experiencing a normal postpartum course  Outcome: Progressing  Flowsheets (Taken 3/12/2024 2345)  Experiences Normal Postpartum Course:   Monitor maternal vital signs   Assess uterine involution     Problem: Postpartum  Goal: Appropriate maternal -  bonding  Description:  Postpartum OB-Pregnancy care plan goal which identifies if the mother and  are bonding appropriately  Outcome: Progressing  Note: Bonding with baby, participating in infant care.      Problem: Postpartum  Goal: Establishment of infant feeding pattern  Description:  Postpartum OB-Pregnancy care plan goal which identifies if the mother is establishing a feeding pattern with their   Outcome: Progressing  Flowsheets (Taken 3/12/2024 2345)  Establishment of Infant Feeding Pattern: Assess breast/bottle feeding     Problem: Postpartum  Goal: Incisions, wounds, or drain sites healing without S/S of infection  Outcome: Progressing  Flowsheets (Taken 
  Problem: Risk for Elopement  Goal: Patient will not exit the unit/facility without proper excort  3/13/2024 1026 by Minal Ronquillo, RN  Outcome: Progressing  Flowsheets (Taken 3/12/2024 2345 by Nicci Mike, RN)  Nursing Interventions for Elopement Risk:   Assist with personal care needs such as toileting, eating, dressing, as needed to reduce the risk of wandering   Communicate/escalate to nursing supervisor the risk of elopement  Note: Pt has officer at [facility]     Problem: Postpartum  Goal: Experiences normal postpartum course  Description:  Postpartum OB-Pregnancy care plan goal which identifies if the mother is experiencing a normal postpartum course  3/13/2024 1026 by Minal Ronquillo, RN  Outcome: Progressing  Flowsheets (Taken 3/13/2024 0809)  Experiences Normal Postpartum Course:   Monitor maternal vital signs   Assess uterine involution  Note: Vital signs stable. Fundus firm midline one below umbilicus     Problem: Postpartum  Goal: Appropriate maternal -  bonding  Description:  Postpartum OB-Pregnancy care plan goal which identifies if the mother and  are bonding appropriately  3/13/2024 1026 by Minal Ronquillo, RN  Outcome: Progressing  Note: Mother bonding well with infant     Problem: Postpartum  Goal: Establishment of infant feeding pattern  Description:  Postpartum OB-Pregnancy care plan goal which identifies if the mother is establishing a feeding pattern with their   3/13/2024 1026 by Minal Ronquillo, RN  Outcome: Progressing  Flowsheets (Taken 3/13/2024 0809)  Establishment of Infant Feeding Pattern: Assess breast/bottle feeding  Note: Mother bottle feeding     Problem: Postpartum  Goal: Incisions, wounds, or drain sites healing without S/S of infection  3/13/2024 1026 by Minal Ronquillo, RN  Outcome: Progressing  Flowsheets (Taken 3/13/2024 0809)  Incisions, Wounds, or Drain Sites Healing Without Sign and Symptoms of Infection: TWICE DAILY: 
level  3/13/2024 2132 by Shannon Espinal RN  Outcome: Progressing  Flowsheets (Taken 3/13/2024 2132)  Verbalizes/displays adequate comfort level or baseline comfort level:   Encourage patient to monitor pain and request assistance   Assess pain using appropriate pain scale     Problem: Infection - Adult  Goal: Absence of infection at discharge  3/13/2024 2132 by Shannon Espinal RN  Outcome: Progressing  Flowsheets (Taken 3/13/2024 2132)  Absence of infection at discharge:   Monitor lab/diagnostic results   Assess and monitor for signs and symptoms of infection     Problem: Safety - Adult  Goal: Free from fall injury  3/13/2024 2132 by Shannon Espinal RN  Outcome: Progressing  Flowsheets (Taken 3/13/2024 2132)  Free From Fall Injury: Instruct family/caregiver on patient safety     Problem: Discharge Planning  Goal: Discharge to home or other facility with appropriate resources  3/13/2024 2132 by Shannon Espinal RN  Outcome: Progressing  Flowsheets (Taken 3/13/2024 2132)  Discharge to home or other facility with appropriate resources: Identify barriers to discharge with patient and caregiver     Problem: Chronic Conditions and Co-morbidities  Goal: Patient's chronic conditions and co-morbidity symptoms are monitored and maintained or improved  3/13/2024 2132 by Shannon Espinal RN  Outcome: Progressing  Flowsheets (Taken 3/13/2024 2132)  Care Plan - Patient's Chronic Conditions and Co-Morbidity Symptoms are Monitored and Maintained or Improved: Monitor and assess patient's chronic conditions and comorbid symptoms for stability, deterioration, or improvement   Care plan reviewed with patient and she contributes to goal setting and voices understanding of plan of care.   
Improved: Monitor and assess patient's chronic conditions and comorbid symptoms for stability, deterioration, or improvement   Care plan reviewed with patient and she.  Patient and she verbalize understanding of the plan of care and contribute to goal setting.